# Patient Record
Sex: FEMALE | Race: OTHER | Employment: OTHER | URBAN - METROPOLITAN AREA
[De-identification: names, ages, dates, MRNs, and addresses within clinical notes are randomized per-mention and may not be internally consistent; named-entity substitution may affect disease eponyms.]

---

## 2018-07-23 NOTE — PATIENT DISCUSSION
Discussed with the patient the predictability of visual outcome after cataract surgery is more difficult in previous refractive surgery patients. The patient is at greater risk for the need of enhancement to achieve desired visual outcome.

## 2018-07-23 NOTE — PATIENT DISCUSSION
The patient expressed a desire to see through the full range of vision from distance, to middle, to near without glasses. The limitations of advanced lens technology were reviewed and the recommendation was made for an extended depth of focus lens (Symfony) in combination with a multifocal lens. Patient understands that each lens will provide only 2 of the 3 ranges of vision. Side effects, specifically halos, reduced contrast, and a 1 in 500 exchange rate due to failure to adapt to the lens were discussed as was the need for enhancement in some cases. The patient elects to proceed with Symfony OS, goal of emmetropia.

## 2018-08-01 NOTE — PATIENT DISCUSSION
ok to proceed with IOL OD due to FD.  Dec for Sx OD made today and goal will be TMF +3.25 OD, goal of emmetropia.

## 2021-11-09 ENCOUNTER — CATARACT EVALUATION (OUTPATIENT)
Dept: URBAN - METROPOLITAN AREA CLINIC 39 | Facility: CLINIC | Age: 66
End: 2021-11-09

## 2021-11-09 DIAGNOSIS — H25.812: ICD-10-CM

## 2021-11-09 DIAGNOSIS — H04.123: ICD-10-CM

## 2021-11-09 DIAGNOSIS — H43.813: ICD-10-CM

## 2021-11-09 DIAGNOSIS — H25.811: ICD-10-CM

## 2021-11-09 DIAGNOSIS — H35.033: ICD-10-CM

## 2021-11-09 PROCEDURE — 99204 OFFICE O/P NEW MOD 45 MIN: CPT

## 2021-11-09 PROCEDURE — V2799PMN IMPRIMIS PRED-MOXI-NEPAF 5ML

## 2021-11-09 PROCEDURE — 92025-2 CORNEAL TOPOGRAPHY, PT

## 2021-11-09 PROCEDURE — 92136TC INTERFEROMETRY - TECHNICAL COMPONENT

## 2021-11-09 PROCEDURE — 92250 FUNDUS PHOTOGRAPHY W/I&R: CPT

## 2021-11-09 PROCEDURE — 92286 ANT SGM IMG I&R SPECLR MIC: CPT

## 2021-11-09 PROCEDURE — 92134 CPTRZ OPH DX IMG PST SGM RTA: CPT

## 2021-11-09 ASSESSMENT — TONOMETRY
OD_IOP_MMHG: 14
OS_IOP_MMHG: 14

## 2021-11-09 ASSESSMENT — VISUAL ACUITY
OS_SC: 20/200
OD_BAT: 20/70
OS_CC: J3
OD_SC: J12
OS_BAT: 20/200
OD_CC: 20/50
OS_CC: 20/70
OS_SC: J12
OD_SC: 20/80
OD_CC: J2

## 2022-01-04 ENCOUNTER — SURGERY/PROCEDURE (OUTPATIENT)
Dept: URBAN - METROPOLITAN AREA CLINIC 39 | Facility: CLINIC | Age: 67
End: 2022-01-04

## 2022-01-04 ENCOUNTER — PRE-OP/H&P (OUTPATIENT)
Dept: URBAN - METROPOLITAN AREA SURGERY 14 | Facility: SURGERY | Age: 67
End: 2022-01-04

## 2022-01-04 DIAGNOSIS — H35.033: ICD-10-CM

## 2022-01-04 DIAGNOSIS — H25.811: ICD-10-CM

## 2022-01-04 DIAGNOSIS — H43.813: ICD-10-CM

## 2022-01-04 DIAGNOSIS — H04.123: ICD-10-CM

## 2022-01-04 DIAGNOSIS — H25.812: ICD-10-CM

## 2022-01-04 PROCEDURE — 99211T TECH SERVICE

## 2022-01-04 PROCEDURE — 66984 XCAPSL CTRC RMVL W/O ECP: CPT

## 2022-01-13 ENCOUNTER — POST OP/EVAL OF SECOND EYE (OUTPATIENT)
Dept: URBAN - METROPOLITAN AREA CLINIC 39 | Facility: CLINIC | Age: 67
End: 2022-01-13

## 2022-01-13 ENCOUNTER — SURGERY/PROCEDURE (OUTPATIENT)
Dept: URBAN - METROPOLITAN AREA CLINIC 39 | Facility: CLINIC | Age: 67
End: 2022-01-13

## 2022-01-13 DIAGNOSIS — H25.812: ICD-10-CM

## 2022-01-13 DIAGNOSIS — Z96.1: ICD-10-CM

## 2022-01-13 PROCEDURE — 99212 OFFICE O/P EST SF 10 MIN: CPT

## 2022-01-13 PROCEDURE — 66984 XCAPSL CTRC RMVL W/O ECP: CPT

## 2022-01-13 ASSESSMENT — VISUAL ACUITY
OS_SC: J12
OD_SC: 20/60-2
OS_CC: 20/70
OS_SC: 20/200
OD_SC: J6
OS_CC: J3

## 2022-01-13 ASSESSMENT — TONOMETRY
OD_IOP_MMHG: 10
OS_IOP_MMHG: 10

## 2023-04-24 NOTE — PATIENT DISCUSSION
[facility] ELECTROPHYSIOLOGY CLINIC VISIT    Assessment/Recommendations   Assessment/Plan:    Ms. 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 She presents today for follow up.       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 Normal systemic ventricular function  2. No further VT episodes for years, off AAT  3. Review of impedance trend shows stable RV tip to RV coil impedance. Lead is programmed: RV pace polarity and RV sense polarity to Tip-Coil. Pacing impedance alert has been turned off and lead integrity alert left on.  Lead integrity alert looks at programmed vector and pacing impedance alert looks at all vectors.  This will provide an alert if there is change in impedance tip-coil. We discussed that if further lead deterioration or integrity alerts despite this programming, then we would need to replace lead. Currently, lead impedance is stable. She will continue to follow with device clinic per routine and has home  Glasses Rx given. monitoring active.     Follow up in 2 years or sooner if need arises.        History of Present Illness/Subjective    Ms. Chikis Hannon is a 42 year old female who comes in today for EP follow-up of ICD lead integrity alert.    Ms. 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     She presented in 3/2014 after having sustained symptomatic VT She was discharged on sotalol 120 mg twice a day. She was seen in clinic on 4/22/14 and her device interrogation showed no recurrence of ventricular arrhythmias. She reported some mild fatigue on Sotalol and her dose was decreased to 80 mg BID. At her July 2014 follow up appointment she only had one 4 second NSVT at 160 bpm which was asymptomatic. At her clinic visit in 2/15 she again had no ventricular arrhythmias and her Sotalol was stopped. After recurrence of VT in June 2015, the patient was restarted on sotalol 80 mg twice a day. We re-attempted VT ablation after holding sotalol for one week on 8/24/2015 with no inducible VT.  [facility] EP Follow up 9/22/15: She presents today for follow-up.  Her device interrogation shows no ventricular arrhythmias.  Her device site has healed well. She denies any syncope or presyncope, no chest pain or shortness of breath on exertion at rest, no orthopnea, no lower extremity edema. Her EKG shows normal sinus rhythm with  RBBB,  ms.  [facility] EP Visit 9/23/17: She presents today for follow up. She reports feeling well. She denies any chest pain, dizziness, lightheadedness, shortness of breath, palpitations, leg/ankle swelling, PND, orthopnea, or syncopal symptoms. She has started exercise classes and is increasing her activity levels. Device interrogation shows normal device function.  17 episodes recorded. 16 VT zone monitored events when she was exercising EGMS shows ST with rates of 168-172 BPM.  1 NSVT for 4 beats at a rate of 211 for 2 second duration.  Intrinsic rhythm = SR @ 90 bpm. = <0.1%. OptiVol fluid index is at baseline. No short v-v intervals recorded.  RV lead impedance trends appear stable. Presenting 12 lead ECG shows SR with RBBB Vent Rate 84 bpm,  ms,  ms, QTc 505 ms. Current cardiac medications include: Sotalol.   Ep Visit 9/2017: She presents today for follow up. She reports feeling well. She denies any chest pain/pressures, dizziness, lightheadedness, shortness of breath, leg/ankle swelling, PND, orthopnea, palpitations, or syncopal symptoms. She continues to exercise and is without limitations. Device interrogation shows normal device function.  2 NSVT episodes recorded, lasting 1-2 seconds. 8 episodes recorded as VT, these EGMs are consistent with ST.  <0.1%. No short v-v intervals recorded. Lead trends appear stable. Presenting 12 lead ECG shows NSR with RBBB Vent Rate 85 bpm,  ms,  ms, QTc 504 ms. Current cardiac medications include: Sotalol.      Ep Visit 2/20/20: She presents today for follow up. She reports feeling well. She states she has not been taking her Sotalol for the last many months. She denies any chest pain/pressures, dizziness, lightheadedness, worsening shortness of breath, leg/ankle swelling, PND, orthopnea, palpitations, or syncopal symptoms. Device interrogation shows  normal ICD function.  Since last remote transmission on 10/29/19, there have been 5  SVT-Wavelet, 7 monitored VT and 2 NSVT episodes recorded.  The SVT episodes last 5-30 seconds at 167-176 bpm, occur with activity and appear to be ST.  The monitored VT episodes last 5-56 seconds at rates of 167-171 bpm.  These also occur with activity and appear  to be ST with several occurring on 11/27/19.  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  The NSVT episodes last 1 second in duration @ 231-226 bpm.   Intrinsic rhythm = SR @ 80 bpm.    =<0.1%. No short v-v intervals recorded.  Lead trends appear stable.  Echo today is stable with normal systemic function. No current cardiac medications.      EP Visit 3/26/21: She presents today for follow up. She reports feeling well. She denies any chest pain/pressures, dizziness, lightheadedness, worsening shortness of breath, leg/ankle swelling, PND, orthopnea, palpitations, or syncopal symptoms. Recent echo showed normal RV size/function, mildly dialted LV with  LVEF 47%, with no significant changes from prior echo. Device interrogation shows normal device function, stable lead parameters,  <0.1%, 46 VT and 2 NSVT all EGMs suggest ST and not true arrhythmia. No current cardiac medications.     EP Visit 10/13/21: She presents today for follow up. She recently had a ICD alarm that prompted her to go in for evaluation. Investigation showed this was a lead integrity alert. Red Alert for Lead integrity was triggered secondary to increase in RV impedance >75% and multiple short R-R intervals. Max impedance was approximately 1000 ohms.  Review of impedance trend shows stable RV tip to RV coil impedance. Tech [employer] recommends programming RV pace polarity and RV sense polarity to Tip-Coil.  They recommend turning off pacing impedance alert and leaving lead integrity alert on.  Lead integrity alert looks at programmed vector and pacing impedance alert looks at all vectors.  This will provide an alert  if there is change in impedance tip-coil. She was brought into clinic and these changes were made. She reports feeling well. She denies chest discomfort, palpitations, abdominal fullness/bloating or peripheral edema, shortness of breath, paroxysmal nocturnal dyspnea, orthopnea, lightheadedness, dizziness, pre-syncope, or syncope. No current cardiac medications.     EP Visit 4/1/22: She presents today for follow up. She presents today for follow up. She reports feeling well. She denies chest discomfort, palpitations, abdominal fullness/bloating or peripheral edema, shortness of breath, paroxysmal nocturnal dyspnea, orthopnea, lightheadedness, dizziness, pre-syncope, or syncope. Device interrogation shows normal device interrogation, RV lead impedance has had warning in past currently 304 ohms, stable lead parameters, 1 NSVT episode recorded, lasting 2 minutes, at 171 bpm. 2 SVT episodes recorded, lasting 6-31 seconds, at 167 bpm - classified as SVT per Wavelet algorithm. EGM reveals similar morphologies, and  <0.1% . No current cardiac medications.     She presents today for follow up. She reports feeling well and offers no complaints. She denies chest discomfort, palpitations, abdominal fullness/bloating or peripheral edema, shortness of breath, paroxysmal nocturnal dyspnea, orthopnea, lightheadedness, dizziness, pre-syncope, or syncope. Presenting 12 lead ECG shows SR with PVC Vent Rate 92 bpm,  ms,  ms, QTc 507 ms. Device interrogation shows normal device function, stable lead parameters, 4 PSVT up to 55 seconds, and  <0.1%. No current cardiac medications.     I have reviewed and updated the patient's Past Medical History, Social History, Family History and Medication List.     Cardiographics (Personally Reviewed) :   LAST CARDIAC MRA:  3/6/14  FINDINGS:   SITUS: There is a normal spleen in the left upper quadrant. There is situs solitus in the chest, as demonstrated by a normal airway pulmonary  artery relationship bilaterally.   CAVAE: Single right-sided inferior and superior vena cavae drain normally into the right atrium unobstructed.   PULMONARY VEINS: Two right and two left pulmonary veins drain into the left atrium unobstructed.   ATRIA: There is no interatrial communication demonstrated. The atrial sizes are normal.   ATRIOVENTRICULAR CONNECTION: Concordant. Separate mitral and tricuspid valves without evidence of significant regurgitation or stenosis.   VENTRICLES: D-loop ventricles with levocardia. Ventricles are normal in size and contraction. Right ventricular end-diastolic volume is upper limits of normal for BSA. If utilizing an ideal body weight, this would be mildly enlarged. No residual interventricular communication is demonstrated. No myocardial hyperenhancement identified on delayed post gadolinium imaging, except at the ventricular insertion points.   VENTRICULOARTERIAL CONNECTION: Concordant. Trileaflet aortic valve without regurgitation or stenosis. The oversewn native pulmonary artery is directly posterior to the aorta. There is now an RV to PA conduit. Mild stenosis of the RVOT conduit with moderate regurgitation, 31% by direct flow measurement, and 33% by ventricular stroke volume comparison.   AORTA AND SUPRA-AORTIC VESSELS: A left-sided aortic arch is demonstrated with normal cervical branching pattern. No evidence of patent ductus arteriosus, coarctation, or aortopulmonary collateral arteries. The coronary arteries are not well visualized in this study.   PULMONARY ARTERY: The RV to PA conduit is patent with normal pulmonary artery branching pattern.   LAST ECHO:  2/2020  ------CONCLUSIONS------  Technically difficult study due to poor acoustic windows. D-TGA status post  Rastelli and subsequent placement of a bio-prosthetic valve in the pulmonary  position. There is no obvious residual ventricular level shunt. Mild (1+)  aortic valve insufficiency. Unable to adequately visualize  the RV-PA conduit  or the bio-prosthetic valve; however, normal antegrade flow is visualized in  the branch pulmonary arteries. A transvenous pacemaker lead is seen in the  right ventricle, with the tip of the lead implanted into the right ventricular  apex. Trivial tricuspid valve insufficiency. Estimated right ventricular  systolic pressure is 26 mmHg plus right atrial pressure. The left and right  ventricles have normal chamber size, wall thickness, and systolic function.  The calculated biplane left ventricular ejection fraction is 57%. No  pericardial effusion.  No significant change from last echocardiogram.  7/25/19 CTA:  IMPRESSION:  1.  D-transposition of aorta s/p Rastelli with bioprosthetic pulmonic  valve  2.  Mildly reduced left ventricular function with LV EF 52%.  3.  Normal RV size and function with RV EF 55%.  4.  RV-PA valved conduit without visual evidence of stenosis.  5.  Mild pulmonic regurgitation (<30 mL) using biventricular stroke  volumes.  6.  No significant change from CT scan in 2014.  7.  Please review radiology review for incidental non-cardiovascular  findings to follow separately     3/23/21 ECHO:  ##### CONCLUSIONS #####  Technically difficult study due to poor acoustic windows. D-TGA status-post  Rastelli and subsequent placement of a bio-prosthetic valve in the pulmonary  position.     Trivial tricuspid valve regurgitation; estimated right ventricular systolic  pressure 26mmHg plus right atrial pressure. Normal right ventricular size and  systolic function. Unable to adequately visualize the RV-PA conduit or the  bio-prosthetic valve; normal antegrade flow profile at the valve annulus; no  obvious regurgitation. Mildly dilated left ventricle with normal wall  thickness and mildly-depressed systolic function; biplane LV EF 47%. A  transvenous pacemaker lead is seen in the right ventricle, with the tip of the  lead implanted into the right ventricular apex. No effusion.     No  significant change from last echocardiogram.       Physical Examination   /70 (BP Location: Right arm, Patient Position: Sitting, Cuff Size: Adult Large)   Pulse 91   SpO2 94%    Wt Readings from Last 3 Encounters:   03/17/23 80.8 kg (178 lb 1.6 oz)   12/13/22 85.3 kg (188 lb)   07/12/22 86.4 kg (190 lb 6.4 oz)     General Appearance:   Alert, well-appearing and in no acute distress.   HEENT: Atraumatic, normocephalic. PERRL.  MMM.   Chest/Lungs:   Respirations unlabored.  Lungs are clear to auscultation.   Cardiovascular:   Regular rate and rhythm. III/VI KRISTI   Abdomen:  Soft, nontender, nondistended.   Extremities: No cyanosis or clubbing. No edema.    Musculoskeletal: Moves all extremities.  Gait normal.   Skin: Warm, dry, intact.    Neurologic: Mood and affect are appropriate.  Alert and oriented to person, place, time, and situation.          Medications  Allergies   Current Outpatient Medications   Medication Sig Dispense Refill     ACE NOT PRESCRIBED, INTENTIONAL, 1 each as needed for other ACE Inhibitor not prescribed due to Other: BP at goal, pending microalbumin test (Patient not taking: Reported on 12/13/2022) 0 each 0     amoxicillin (AMOXIL) 500 MG capsule Please take 2,000 mg (4 capsule) 30-60 min prior to all dental procedures 4 capsule 3     atorvastatin (LIPITOR) 40 MG tablet Take 1 tablet (40 mg) by mouth At Bedtime 90 tablet 3     blood glucose (ACCU-CHEK SMARTVIEW) test strip Use to test blood sugar 1 times daily or as directed. 100 strip 1     blood glucose monitoring (ACCU-CHEK FASTCLIX) lancets Use to test blood sugar 1 times daily or as directed. 1 Box 3     empagliflozin (JARDIANCE) 10 MG TABS tablet Take 1 tablet (10 mg) by mouth daily 90 tablet 1     etonogestrel (NEXPLANON) 68 MG IMPL 1 each (68 mg) by Subdermal route once       metFORMIN (GLUCOPHAGE) 500 MG tablet Take 2 tablets (1,000 mg) by mouth 2 times daily (with meals) 360 tablet 1     sertraline (ZOLOFT) 25 MG tablet Take  1 tablet (25 mg) by mouth At Bedtime 90 tablet 1    Allergies   Allergen Reactions     Adhesive Tape Rash         Lab Results (Personally Reviewed)    Chemistry/lipid CBC Cardiac Enzymes/BNP/TSH/INR   Lab Results   Component Value Date    BUN 5.9 (L) 12/13/2022     12/13/2022    CO2 22 12/13/2022     Creatinine   Date Value Ref Range Status   12/13/2022 0.54 0.51 - 0.95 mg/dL Final   03/23/2021 0.61 0.52 - 1.04 mg/dL Final       Lab Results   Component Value Date    CHOL 193 12/13/2022    HDL 46 (L) 12/13/2022     (H) 12/13/2022      Lab Results   Component Value Date    WBC 6.2 04/13/2022    HGB 13.4 04/13/2022    HCT 41.2 04/13/2022    MCV 86 04/13/2022     04/13/2022    Lab Results   Component Value Date    TSH 2.01 03/17/2023    INR 1.13 06/01/2015        The patient states understanding and is agreeable with the plan.   Vimal Ray MD [facility]  Cardiology - Electrophysiology    Total time spent on patient visit, reviewing notes, imaging, labs, orders, and completing necessary documentation: 30 minutes.  >50% of visit spent on counseling patient and/or coordination of care.